# Patient Record
Sex: MALE | Race: WHITE | NOT HISPANIC OR LATINO | Employment: FULL TIME | URBAN - METROPOLITAN AREA
[De-identification: names, ages, dates, MRNs, and addresses within clinical notes are randomized per-mention and may not be internally consistent; named-entity substitution may affect disease eponyms.]

---

## 2019-08-19 ENCOUNTER — HISTORICAL (OUTPATIENT)
Dept: RADIOLOGY | Facility: HOSPITAL | Age: 54
End: 2019-08-19

## 2022-04-10 ENCOUNTER — HISTORICAL (OUTPATIENT)
Dept: ADMINISTRATIVE | Facility: HOSPITAL | Age: 57
End: 2022-04-10

## 2022-04-28 VITALS
HEIGHT: 69 IN | WEIGHT: 198.56 LBS | BODY MASS INDEX: 29.41 KG/M2 | DIASTOLIC BLOOD PRESSURE: 80 MMHG | SYSTOLIC BLOOD PRESSURE: 122 MMHG

## 2022-05-02 ENCOUNTER — HOSPITAL ENCOUNTER (OUTPATIENT)
Dept: RADIOLOGY | Facility: HOSPITAL | Age: 57
Discharge: HOME | End: 2022-05-02
Attending: INTERNAL MEDICINE
Payer: COMMERCIAL

## 2022-05-02 DIAGNOSIS — M25.511 RIGHT SHOULDER PAIN: Primary | ICD-10-CM

## 2022-05-02 DIAGNOSIS — M25.511 RIGHT SHOULDER PAIN: ICD-10-CM

## 2022-05-02 PROCEDURE — 73030 X-RAY EXAM OF SHOULDER: CPT | Mod: TC,RT

## 2023-11-13 ENCOUNTER — HOSPITAL ENCOUNTER (OUTPATIENT)
Dept: RADIOLOGY | Facility: HOSPITAL | Age: 58
Discharge: HOME | End: 2023-11-13
Attending: INTERNAL MEDICINE
Payer: COMMERCIAL

## 2023-11-13 DIAGNOSIS — R05.1 ACUTE COUGH: ICD-10-CM

## 2023-11-13 PROCEDURE — 71046 X-RAY EXAM CHEST 2 VIEWS: CPT | Mod: TC

## 2023-11-27 DIAGNOSIS — Z12.2 SCREENING FOR MALIGNANT NEOPLASM OF RESPIRATORY ORGAN: Primary | ICD-10-CM

## 2023-12-12 ENCOUNTER — HOSPITAL ENCOUNTER (OUTPATIENT)
Dept: RADIOLOGY | Facility: HOSPITAL | Age: 58
Discharge: HOME | End: 2023-12-12
Attending: INTERNAL MEDICINE
Payer: COMMERCIAL

## 2023-12-12 DIAGNOSIS — Z12.2 SCREENING FOR MALIGNANT NEOPLASM OF RESPIRATORY ORGAN: ICD-10-CM

## 2023-12-12 PROCEDURE — 71271 CT THORAX LUNG CANCER SCR C-: CPT | Mod: TC

## 2023-12-14 ENCOUNTER — HOSPITAL ENCOUNTER (OUTPATIENT)
Dept: RADIOLOGY | Facility: HOSPITAL | Age: 58
Discharge: HOME | End: 2023-12-14
Attending: CHIROPRACTOR
Payer: COMMERCIAL

## 2023-12-14 DIAGNOSIS — M54.2 CERVICALGIA: ICD-10-CM

## 2023-12-14 PROCEDURE — 72100 X-RAY EXAM L-S SPINE 2/3 VWS: CPT | Mod: TC

## 2023-12-14 PROCEDURE — 72070 X-RAY EXAM THORAC SPINE 2VWS: CPT | Mod: TC

## 2023-12-14 PROCEDURE — 72040 X-RAY EXAM NECK SPINE 2-3 VW: CPT | Mod: TC

## 2024-12-03 DIAGNOSIS — Z12.2 SCREENING FOR MALIGNANT NEOPLASM OF RESPIRATORY ORGAN: Primary | ICD-10-CM

## 2024-12-13 ENCOUNTER — HOSPITAL ENCOUNTER (OUTPATIENT)
Dept: RADIOLOGY | Facility: HOSPITAL | Age: 59
Discharge: HOME | End: 2024-12-13
Attending: INTERNAL MEDICINE
Payer: COMMERCIAL

## 2024-12-13 DIAGNOSIS — Z12.2 SCREENING FOR MALIGNANT NEOPLASM OF RESPIRATORY ORGAN: ICD-10-CM

## 2024-12-13 PROCEDURE — 71271 CT THORAX LUNG CANCER SCR C-: CPT | Mod: TC

## 2025-06-23 ENCOUNTER — HOSPITAL ENCOUNTER (OUTPATIENT)
Facility: HOSPITAL | Age: 60
Discharge: HOME | End: 2025-06-24
Attending: INTERNAL MEDICINE | Admitting: INTERNAL MEDICINE
Payer: COMMERCIAL

## 2025-06-23 ENCOUNTER — ANESTHESIA (OUTPATIENT)
Dept: SURGERY | Facility: HOSPITAL | Age: 60
End: 2025-06-23
Payer: COMMERCIAL

## 2025-06-23 ENCOUNTER — ANESTHESIA EVENT (OUTPATIENT)
Dept: SURGERY | Facility: HOSPITAL | Age: 60
End: 2025-06-23
Payer: COMMERCIAL

## 2025-06-23 DIAGNOSIS — R00.0 TACHYCARDIA: ICD-10-CM

## 2025-06-23 DIAGNOSIS — I95.1 ORTHOSTATIC HYPOTENSION: ICD-10-CM

## 2025-06-23 LAB
ABORH RETYPE: NORMAL
ALBUMIN SERPL-MCNC: 4.2 G/DL (ref 3.4–4.8)
ALBUMIN/GLOB SERPL: 1.1 RATIO (ref 1.1–2)
ALP SERPL-CCNC: 60 UNIT/L (ref 40–150)
ALT SERPL-CCNC: 20 UNIT/L (ref 0–55)
ANION GAP SERPL CALC-SCNC: 11 MEQ/L
APTT PPP: 31.5 SECONDS (ref 24.2–35.9)
AST SERPL-CCNC: 19 UNIT/L (ref 11–45)
BACTERIA #/AREA URNS AUTO: ABNORMAL /HPF
BASOPHILS # BLD AUTO: 0.04 X10(3)/MCL
BASOPHILS NFR BLD AUTO: 0.4 %
BILIRUB SERPL-MCNC: 0.7 MG/DL
BILIRUB UR QL STRIP.AUTO: NEGATIVE
BUN SERPL-MCNC: 22 MG/DL (ref 8.4–25.7)
CALCIUM SERPL-MCNC: 10.1 MG/DL (ref 8.8–10)
CAOX CRY UR QL COMP ASSIST: ABNORMAL
CHLORIDE SERPL-SCNC: 102 MMOL/L (ref 98–107)
CLARITY UR: CLEAR
CO2 SERPL-SCNC: 25 MMOL/L (ref 23–31)
COLOR UR AUTO: YELLOW
CREAT SERPL-MCNC: 2.4 MG/DL (ref 0.72–1.25)
CREAT/UREA NIT SERPL: 9
EOSINOPHIL # BLD AUTO: 0.01 X10(3)/MCL (ref 0–0.9)
EOSINOPHIL NFR BLD AUTO: 0.1 %
ERYTHROCYTE [DISTWIDTH] IN BLOOD BY AUTOMATED COUNT: 14.6 % (ref 11.5–17)
GFR SERPLBLD CREATININE-BSD FMLA CKD-EPI: 30 ML/MIN/1.73/M2
GLOBULIN SER-MCNC: 3.7 GM/DL (ref 2.4–3.5)
GLUCOSE SERPL-MCNC: 93 MG/DL (ref 82–115)
GLUCOSE UR QL STRIP: NEGATIVE
GROUP & RH: NORMAL
HCT VFR BLD AUTO: 36.5 % (ref 42–52)
HCT VFR BLD AUTO: 43.8 % (ref 42–52)
HGB BLD-MCNC: 11.6 G/DL (ref 14–18)
HGB BLD-MCNC: 13.9 G/DL (ref 14–18)
HGB UR QL STRIP: NEGATIVE
IMM GRANULOCYTES # BLD AUTO: 0.03 X10(3)/MCL (ref 0–0.04)
IMM GRANULOCYTES NFR BLD AUTO: 0.3 %
INDIRECT COOMBS: NORMAL
INR PPP: 1
KETONES UR QL STRIP: NEGATIVE
LEUKOCYTE ESTERASE UR QL STRIP: NEGATIVE
LYMPHOCYTES # BLD AUTO: 1.47 X10(3)/MCL (ref 0.6–4.6)
LYMPHOCYTES NFR BLD AUTO: 13 %
MAGNESIUM SERPL-MCNC: 1.8 MG/DL (ref 1.6–2.6)
MCH RBC QN AUTO: 26.4 PG (ref 27–31)
MCHC RBC AUTO-ENTMCNC: 31.7 G/DL (ref 33–36)
MCV RBC AUTO: 83.3 FL (ref 80–94)
MONOCYTES # BLD AUTO: 1.05 X10(3)/MCL (ref 0.1–1.3)
MONOCYTES NFR BLD AUTO: 9.3 %
NEUTROPHILS # BLD AUTO: 8.7 X10(3)/MCL (ref 2.1–9.2)
NEUTROPHILS NFR BLD AUTO: 76.9 %
NITRITE UR QL STRIP: NEGATIVE
NRBC BLD AUTO-RTO: 0 %
OHS QRS DURATION: 94 MS
OHS QTC CALCULATION: 459 MS
PH UR STRIP: 7 [PH]
PHOSPHATE SERPL-MCNC: 4.2 MG/DL (ref 2.3–4.7)
PLATELET # BLD AUTO: 314 X10(3)/MCL (ref 130–400)
PMV BLD AUTO: 11.5 FL (ref 7.4–10.4)
POTASSIUM SERPL-SCNC: 3.9 MMOL/L (ref 3.5–5.1)
PROT SERPL-MCNC: 7.9 GM/DL (ref 5.8–7.6)
PROT UR QL STRIP: ABNORMAL
PROTHROMBIN TIME: 13.8 SECONDS (ref 12.4–14.9)
RBC # BLD AUTO: 5.26 X10(6)/MCL (ref 4.7–6.1)
RBC #/AREA URNS AUTO: ABNORMAL /HPF
SODIUM SERPL-SCNC: 138 MMOL/L (ref 136–145)
SP GR UR STRIP.AUTO: 1.01 (ref 1–1.03)
SPECIMEN OUTDATE: NORMAL
SQUAMOUS #/AREA URNS AUTO: ABNORMAL /HPF
TROPONIN I SERPL-MCNC: <0.01 NG/ML (ref 0–0.04)
UROBILINOGEN UR STRIP-ACNC: 0.2
WBC # BLD AUTO: 11.3 X10(3)/MCL (ref 4.5–11.5)
WBC #/AREA URNS AUTO: ABNORMAL /HPF

## 2025-06-23 PROCEDURE — 99900035 HC TECH TIME PER 15 MIN (STAT)

## 2025-06-23 PROCEDURE — 25000003 PHARM REV CODE 250: Performed by: SURGERY

## 2025-06-23 PROCEDURE — 11000001 HC ACUTE MED/SURG PRIVATE ROOM

## 2025-06-23 PROCEDURE — 94799 UNLISTED PULMONARY SVC/PX: CPT

## 2025-06-23 PROCEDURE — 93010 ELECTROCARDIOGRAM REPORT: CPT | Mod: ,,, | Performed by: STUDENT IN AN ORGANIZED HEALTH CARE EDUCATION/TRAINING PROGRAM

## 2025-06-23 PROCEDURE — G0378 HOSPITAL OBSERVATION PER HR: HCPCS

## 2025-06-23 PROCEDURE — 86923 COMPATIBILITY TEST ELECTRIC: CPT | Performed by: INTERNAL MEDICINE

## 2025-06-23 PROCEDURE — 84100 ASSAY OF PHOSPHORUS: CPT | Performed by: INTERNAL MEDICINE

## 2025-06-23 PROCEDURE — 94761 N-INVAS EAR/PLS OXIMETRY MLT: CPT

## 2025-06-23 PROCEDURE — 37000008 HC ANESTHESIA 1ST 15 MINUTES: Performed by: SURGERY

## 2025-06-23 PROCEDURE — 27201423 OPTIME MED/SURG SUP & DEVICES STERILE SUPPLY: Performed by: SURGERY

## 2025-06-23 PROCEDURE — G0379 DIRECT REFER HOSPITAL OBSERV: HCPCS

## 2025-06-23 PROCEDURE — 83735 ASSAY OF MAGNESIUM: CPT | Performed by: INTERNAL MEDICINE

## 2025-06-23 PROCEDURE — 85025 COMPLETE CBC W/AUTO DIFF WBC: CPT | Performed by: INTERNAL MEDICINE

## 2025-06-23 PROCEDURE — 25000003 PHARM REV CODE 250: Performed by: INTERNAL MEDICINE

## 2025-06-23 PROCEDURE — 63600175 PHARM REV CODE 636 W HCPCS: Performed by: SURGERY

## 2025-06-23 PROCEDURE — 86901 BLOOD TYPING SEROLOGIC RH(D): CPT | Performed by: INTERNAL MEDICINE

## 2025-06-23 PROCEDURE — 84484 ASSAY OF TROPONIN QUANT: CPT | Performed by: INTERNAL MEDICINE

## 2025-06-23 PROCEDURE — 85730 THROMBOPLASTIN TIME PARTIAL: CPT | Performed by: INTERNAL MEDICINE

## 2025-06-23 PROCEDURE — 93005 ELECTROCARDIOGRAM TRACING: CPT

## 2025-06-23 PROCEDURE — 85018 HEMOGLOBIN: CPT | Performed by: INTERNAL MEDICINE

## 2025-06-23 PROCEDURE — 81001 URINALYSIS AUTO W/SCOPE: CPT | Performed by: INTERNAL MEDICINE

## 2025-06-23 PROCEDURE — 43239 EGD BIOPSY SINGLE/MULTIPLE: CPT | Performed by: SURGERY

## 2025-06-23 PROCEDURE — 80053 COMPREHEN METABOLIC PANEL: CPT | Performed by: INTERNAL MEDICINE

## 2025-06-23 PROCEDURE — 85610 PROTHROMBIN TIME: CPT | Performed by: INTERNAL MEDICINE

## 2025-06-23 PROCEDURE — 63600175 PHARM REV CODE 636 W HCPCS: Performed by: INTERNAL MEDICINE

## 2025-06-23 PROCEDURE — 36415 COLL VENOUS BLD VENIPUNCTURE: CPT | Performed by: INTERNAL MEDICINE

## 2025-06-23 PROCEDURE — 63600175 PHARM REV CODE 636 W HCPCS: Performed by: NURSE ANESTHETIST, CERTIFIED REGISTERED

## 2025-06-23 RX ORDER — ONDANSETRON HYDROCHLORIDE 2 MG/ML
4 INJECTION, SOLUTION INTRAVENOUS EVERY 6 HOURS PRN
Status: DISCONTINUED | OUTPATIENT
Start: 2025-06-23 | End: 2025-06-24 | Stop reason: HOSPADM

## 2025-06-23 RX ORDER — PANTOPRAZOLE SODIUM 40 MG/1
40 TABLET, DELAYED RELEASE ORAL DAILY
COMMUNITY

## 2025-06-23 RX ORDER — SODIUM CHLORIDE 9 MG/ML
INJECTION, SOLUTION INTRAVENOUS CONTINUOUS
Status: DISCONTINUED | OUTPATIENT
Start: 2025-06-23 | End: 2025-06-24 | Stop reason: HOSPADM

## 2025-06-23 RX ORDER — ACETAMINOPHEN 325 MG/1
650 TABLET ORAL EVERY 4 HOURS PRN
Status: DISCONTINUED | OUTPATIENT
Start: 2025-06-23 | End: 2025-06-24 | Stop reason: HOSPADM

## 2025-06-23 RX ORDER — PANTOPRAZOLE SODIUM 40 MG/10ML
40 INJECTION, POWDER, LYOPHILIZED, FOR SOLUTION INTRAVENOUS DAILY
Status: DISCONTINUED | OUTPATIENT
Start: 2025-06-23 | End: 2025-06-23

## 2025-06-23 RX ORDER — SUCRALFATE 1 G/10ML
1 SUSPENSION ORAL EVERY 6 HOURS
Status: DISCONTINUED | OUTPATIENT
Start: 2025-06-24 | End: 2025-06-24 | Stop reason: HOSPADM

## 2025-06-23 RX ORDER — HYDROCODONE BITARTRATE AND ACETAMINOPHEN 500; 5 MG/1; MG/1
TABLET ORAL
Status: DISCONTINUED | OUTPATIENT
Start: 2025-06-23 | End: 2025-06-24 | Stop reason: HOSPADM

## 2025-06-23 RX ORDER — LIDOCAINE HYDROCHLORIDE 20 MG/ML
INJECTION INTRAVENOUS
Status: DISCONTINUED | OUTPATIENT
Start: 2025-06-23 | End: 2025-06-23

## 2025-06-23 RX ORDER — SODIUM CHLORIDE 9 MG/ML
INJECTION, SOLUTION INTRAVENOUS CONTINUOUS
Status: DISCONTINUED | OUTPATIENT
Start: 2025-06-23 | End: 2025-06-23

## 2025-06-23 RX ORDER — HYDROMORPHONE HYDROCHLORIDE 2 MG/ML
0.25 INJECTION, SOLUTION INTRAMUSCULAR; INTRAVENOUS; SUBCUTANEOUS EVERY 4 HOURS PRN
Status: DISCONTINUED | OUTPATIENT
Start: 2025-06-23 | End: 2025-06-24 | Stop reason: HOSPADM

## 2025-06-23 RX ORDER — TALC
6 POWDER (GRAM) TOPICAL NIGHTLY PRN
Status: DISCONTINUED | OUTPATIENT
Start: 2025-06-23 | End: 2025-06-24 | Stop reason: HOSPADM

## 2025-06-23 RX ORDER — FAMOTIDINE 20 MG/1
20 TABLET, FILM COATED ORAL DAILY
Status: DISCONTINUED | OUTPATIENT
Start: 2025-06-23 | End: 2025-06-23 | Stop reason: SDUPTHER

## 2025-06-23 RX ORDER — CELECOXIB 200 MG/1
200 CAPSULE ORAL DAILY
Status: ON HOLD | COMMUNITY
End: 2025-06-24 | Stop reason: HOSPADM

## 2025-06-23 RX ORDER — HYDROCODONE BITARTRATE AND ACETAMINOPHEN 5; 325 MG/1; MG/1
1 TABLET ORAL EVERY 4 HOURS PRN
Status: DISCONTINUED | OUTPATIENT
Start: 2025-06-23 | End: 2025-06-24 | Stop reason: HOSPADM

## 2025-06-23 RX ORDER — MUPIROCIN 20 MG/G
1 OINTMENT TOPICAL 2 TIMES DAILY
Status: DISCONTINUED | OUTPATIENT
Start: 2025-06-23 | End: 2025-06-24 | Stop reason: HOSPADM

## 2025-06-23 RX ORDER — BISMUTH SUBSALICYLATE 525 MG/30ML
30 LIQUID ORAL EVERY 6 HOURS
Status: DISCONTINUED | OUTPATIENT
Start: 2025-06-24 | End: 2025-06-24 | Stop reason: HOSPADM

## 2025-06-23 RX ORDER — CYANOCOBALAMIN 1000 UG/ML
1000 INJECTION, SOLUTION INTRAMUSCULAR; SUBCUTANEOUS DAILY
Status: DISCONTINUED | OUTPATIENT
Start: 2025-06-24 | End: 2025-06-24 | Stop reason: HOSPADM

## 2025-06-23 RX ORDER — FAMOTIDINE 20 MG/1
20 TABLET, FILM COATED ORAL DAILY
Status: DISCONTINUED | OUTPATIENT
Start: 2025-06-23 | End: 2025-06-24 | Stop reason: HOSPADM

## 2025-06-23 RX ORDER — OLMESARTAN MEDOXOMIL AND HYDROCHLOROTHIAZIDE 40/25 40; 25 MG/1; MG/1
1 TABLET ORAL DAILY
COMMUNITY

## 2025-06-23 RX ORDER — SUCRALFATE 1 G/1
1 TABLET ORAL
Status: DISCONTINUED | OUTPATIENT
Start: 2025-06-23 | End: 2025-06-23

## 2025-06-23 RX ORDER — ALPRAZOLAM 0.5 MG/1
0.5 TABLET ORAL 3 TIMES DAILY PRN
COMMUNITY

## 2025-06-23 RX ORDER — PROPOFOL 10 MG/ML
VIAL (ML) INTRAVENOUS
Status: DISCONTINUED | OUTPATIENT
Start: 2025-06-23 | End: 2025-06-23

## 2025-06-23 RX ORDER — ACETAMINOPHEN 325 MG/1
650 TABLET ORAL EVERY 6 HOURS PRN
Status: DISCONTINUED | OUTPATIENT
Start: 2025-06-23 | End: 2025-06-24 | Stop reason: HOSPADM

## 2025-06-23 RX ORDER — CLOPIDOGREL BISULFATE 75 MG/1
75 TABLET ORAL DAILY
COMMUNITY

## 2025-06-23 RX ORDER — ERGOCALCIFEROL 1.25 MG/1
50000 CAPSULE ORAL
COMMUNITY

## 2025-06-23 RX ORDER — PANTOPRAZOLE SODIUM 40 MG/10ML
80 INJECTION, POWDER, LYOPHILIZED, FOR SOLUTION INTRAVENOUS ONCE
Status: COMPLETED | OUTPATIENT
Start: 2025-06-23 | End: 2025-06-23

## 2025-06-23 RX ADMIN — SUCRALFATE 1 G: 1 SUSPENSION ORAL at 11:06

## 2025-06-23 RX ADMIN — SODIUM CHLORIDE 125 MG: 9 INJECTION, SOLUTION INTRAVENOUS at 10:06

## 2025-06-23 RX ADMIN — SODIUM CHLORIDE: 9 INJECTION, SOLUTION INTRAVENOUS at 01:06

## 2025-06-23 RX ADMIN — BISMUTH SUBSALICYLATE 30 ML: 525 LIQUID ORAL at 11:06

## 2025-06-23 RX ADMIN — PROPOFOL 50 MG: 10 INJECTION, EMULSION INTRAVENOUS at 07:06

## 2025-06-23 RX ADMIN — FOLIC ACID 5 MG: 5 INJECTION, SOLUTION INTRAMUSCULAR; INTRAVENOUS; SUBCUTANEOUS at 10:06

## 2025-06-23 RX ADMIN — PANTOPRAZOLE SODIUM 80 MG: 40 INJECTION, POWDER, FOR SOLUTION INTRAVENOUS at 12:06

## 2025-06-23 RX ADMIN — PANTOPRAZOLE SODIUM 8 MG/HR: 40 INJECTION, POWDER, FOR SOLUTION INTRAVENOUS at 05:06

## 2025-06-23 RX ADMIN — MUPIROCIN 1 G: 20 OINTMENT TOPICAL at 09:06

## 2025-06-23 RX ADMIN — SODIUM CHLORIDE: 9 INJECTION, SOLUTION INTRAVENOUS at 09:06

## 2025-06-23 RX ADMIN — LIDOCAINE HYDROCHLORIDE 100 MG: 20 INJECTION, SOLUTION INTRAVENOUS at 07:06

## 2025-06-23 RX ADMIN — SODIUM CHLORIDE: 9 INJECTION, SOLUTION INTRAVENOUS at 10:06

## 2025-06-23 RX ADMIN — FAMOTIDINE 20 MG: 20 TABLET, FILM COATED ORAL at 09:06

## 2025-06-23 RX ADMIN — SODIUM CHLORIDE 1000 ML: 9 INJECTION, SOLUTION INTRAVENOUS at 12:06

## 2025-06-23 RX ADMIN — PROPOFOL 100 MG: 10 INJECTION, EMULSION INTRAVENOUS at 07:06

## 2025-06-23 RX ADMIN — PANTOPRAZOLE SODIUM 8 MG/HR: 40 INJECTION, POWDER, FOR SOLUTION INTRAVENOUS at 01:06

## 2025-06-23 NOTE — ANESTHESIA PREPROCEDURE EVALUATION
06/23/2025  Rehan Cohn is a 59 y.o., male.      Pre-op Assessment    I have reviewed the Patient Summary Reports.     I have reviewed the Nursing Notes. I have reviewed the NPO Status.   I have reviewed the Medications.     Review of Systems  Cardiovascular:     Hypertension                                          Endocrine:        Obesity / BMI > 30      Physical Exam  General: Well nourished, Cooperative, Oriented and Alert    Airway:  Mallampati: II         Anesthesia Plan  Type of Anesthesia, risks & benefits discussed:    Anesthesia Type: Gen Natural Airway  Intra-op Monitoring Plan: Standard ASA Monitors  Informed Consent: Informed consent signed with the Patient and all parties understand the risks and agree with anesthesia plan.  All questions answered. Patient consented to blood products? Yes  ASA Score: 2 Emergent    Ready For Surgery From Anesthesia Perspective.     .

## 2025-06-24 VITALS
RESPIRATION RATE: 16 BRPM | BODY MASS INDEX: 31.1 KG/M2 | HEART RATE: 80 BPM | SYSTOLIC BLOOD PRESSURE: 116 MMHG | OXYGEN SATURATION: 98 % | WEIGHT: 210 LBS | HEIGHT: 69 IN | DIASTOLIC BLOOD PRESSURE: 66 MMHG | TEMPERATURE: 98 F

## 2025-06-24 PROBLEM — I95.1 ORTHOSTATIC HYPOTENSION: Status: RESOLVED | Noted: 2025-06-23 | Resolved: 2025-06-24

## 2025-06-24 LAB
ALBUMIN SERPL-MCNC: 3.2 G/DL (ref 3.4–4.8)
ALBUMIN/GLOB SERPL: 1.1 RATIO (ref 1.1–2)
ALP SERPL-CCNC: 46 UNIT/L (ref 40–150)
ALT SERPL-CCNC: 14 UNIT/L (ref 0–55)
ANION GAP SERPL CALC-SCNC: 6 MEQ/L
AST SERPL-CCNC: 14 UNIT/L (ref 11–45)
BASOPHILS # BLD AUTO: 0.05 X10(3)/MCL
BASOPHILS NFR BLD AUTO: 0.7 %
BILIRUB SERPL-MCNC: 0.6 MG/DL
BUN SERPL-MCNC: 20 MG/DL (ref 8.4–25.7)
CALCIUM SERPL-MCNC: 8.2 MG/DL (ref 8.8–10)
CHLORIDE SERPL-SCNC: 109 MMOL/L (ref 98–107)
CO2 SERPL-SCNC: 24 MMOL/L (ref 23–31)
CREAT SERPL-MCNC: 1.67 MG/DL (ref 0.72–1.25)
CREAT/UREA NIT SERPL: 12
EOSINOPHIL # BLD AUTO: 0.05 X10(3)/MCL (ref 0–0.9)
EOSINOPHIL NFR BLD AUTO: 0.7 %
ERYTHROCYTE [DISTWIDTH] IN BLOOD BY AUTOMATED COUNT: 14.6 % (ref 11.5–17)
GFR SERPLBLD CREATININE-BSD FMLA CKD-EPI: 46 ML/MIN/1.73/M2
GLOBULIN SER-MCNC: 2.8 GM/DL (ref 2.4–3.5)
GLUCOSE SERPL-MCNC: 84 MG/DL (ref 82–115)
HCT VFR BLD AUTO: 34.2 % (ref 42–52)
HCT VFR BLD AUTO: 37.2 % (ref 42–52)
HGB BLD-MCNC: 10.9 G/DL (ref 14–18)
HGB BLD-MCNC: 11.7 G/DL (ref 14–18)
IMM GRANULOCYTES # BLD AUTO: 0.02 X10(3)/MCL (ref 0–0.04)
IMM GRANULOCYTES NFR BLD AUTO: 0.3 %
LYMPHOCYTES # BLD AUTO: 2.54 X10(3)/MCL (ref 0.6–4.6)
LYMPHOCYTES NFR BLD AUTO: 34.9 %
MAGNESIUM SERPL-MCNC: 1.9 MG/DL (ref 1.6–2.6)
MCH RBC QN AUTO: 26.6 PG (ref 27–31)
MCHC RBC AUTO-ENTMCNC: 31.9 G/DL (ref 33–36)
MCV RBC AUTO: 83.4 FL (ref 80–94)
MONOCYTES # BLD AUTO: 0.76 X10(3)/MCL (ref 0.1–1.3)
MONOCYTES NFR BLD AUTO: 10.4 %
NEUTROPHILS # BLD AUTO: 3.86 X10(3)/MCL (ref 2.1–9.2)
NEUTROPHILS NFR BLD AUTO: 53 %
NRBC BLD AUTO-RTO: 0 %
PHOSPHATE SERPL-MCNC: 3.1 MG/DL (ref 2.3–4.7)
PLATELET # BLD AUTO: 237 X10(3)/MCL (ref 130–400)
PMV BLD AUTO: 11.2 FL (ref 7.4–10.4)
POTASSIUM SERPL-SCNC: 3.4 MMOL/L (ref 3.5–5.1)
PROT SERPL-MCNC: 6 GM/DL (ref 5.8–7.6)
RBC # BLD AUTO: 4.1 X10(6)/MCL (ref 4.7–6.1)
SODIUM SERPL-SCNC: 139 MMOL/L (ref 136–145)
WBC # BLD AUTO: 7.28 X10(3)/MCL (ref 4.5–11.5)

## 2025-06-24 PROCEDURE — 94799 UNLISTED PULMONARY SVC/PX: CPT

## 2025-06-24 PROCEDURE — 63600175 PHARM REV CODE 636 W HCPCS: Performed by: SURGERY

## 2025-06-24 PROCEDURE — 96365 THER/PROPH/DIAG IV INF INIT: CPT

## 2025-06-24 PROCEDURE — 94761 N-INVAS EAR/PLS OXIMETRY MLT: CPT

## 2025-06-24 PROCEDURE — 99900035 HC TECH TIME PER 15 MIN (STAT)

## 2025-06-24 PROCEDURE — 85014 HEMATOCRIT: CPT | Performed by: INTERNAL MEDICINE

## 2025-06-24 PROCEDURE — 63600175 PHARM REV CODE 636 W HCPCS: Performed by: INTERNAL MEDICINE

## 2025-06-24 PROCEDURE — 80053 COMPREHEN METABOLIC PANEL: CPT | Performed by: SURGERY

## 2025-06-24 PROCEDURE — 96375 TX/PRO/DX INJ NEW DRUG ADDON: CPT

## 2025-06-24 PROCEDURE — 25000003 PHARM REV CODE 250: Performed by: SURGERY

## 2025-06-24 PROCEDURE — G0378 HOSPITAL OBSERVATION PER HR: HCPCS

## 2025-06-24 PROCEDURE — 84100 ASSAY OF PHOSPHORUS: CPT | Performed by: INTERNAL MEDICINE

## 2025-06-24 PROCEDURE — 83735 ASSAY OF MAGNESIUM: CPT | Performed by: INTERNAL MEDICINE

## 2025-06-24 PROCEDURE — 25000003 PHARM REV CODE 250: Performed by: INTERNAL MEDICINE

## 2025-06-24 PROCEDURE — 36415 COLL VENOUS BLD VENIPUNCTURE: CPT | Performed by: INTERNAL MEDICINE

## 2025-06-24 PROCEDURE — 96372 THER/PROPH/DIAG INJ SC/IM: CPT | Performed by: SURGERY

## 2025-06-24 PROCEDURE — 85025 COMPLETE CBC W/AUTO DIFF WBC: CPT | Performed by: INTERNAL MEDICINE

## 2025-06-24 RX ORDER — SUCRALFATE 1 G/10ML
1 SUSPENSION ORAL EVERY 6 HOURS
Qty: 1200 ML | Refills: 0 | Status: SHIPPED | OUTPATIENT
Start: 2025-06-24 | End: 2025-07-24

## 2025-06-24 RX ORDER — BISMUTH SUBSALICYLATE 525 MG/30ML
30 LIQUID ORAL EVERY 6 HOURS
Qty: 473 ML | Refills: 0 | Status: SHIPPED | OUTPATIENT
Start: 2025-06-24 | End: 2025-07-24

## 2025-06-24 RX ORDER — POLYETHYLENE GLYCOL 3350 17 G/17G
238 POWDER, FOR SOLUTION ORAL ONCE
Status: DISCONTINUED | OUTPATIENT
Start: 2025-06-24 | End: 2025-06-24 | Stop reason: HOSPADM

## 2025-06-24 RX ORDER — FAMOTIDINE 20 MG/1
20 TABLET, FILM COATED ORAL DAILY
Qty: 30 TABLET | Refills: 0 | Status: SHIPPED | OUTPATIENT
Start: 2025-06-24 | End: 2025-07-24

## 2025-06-24 RX ORDER — PANTOPRAZOLE SODIUM 40 MG/1
40 TABLET, DELAYED RELEASE ORAL DAILY
Qty: 30 TABLET | Refills: 0 | Status: SHIPPED | OUTPATIENT
Start: 2025-06-24 | End: 2026-06-24

## 2025-06-24 RX ORDER — POTASSIUM CHLORIDE 20 MEQ/1
20 TABLET, EXTENDED RELEASE ORAL ONCE
Status: COMPLETED | OUTPATIENT
Start: 2025-06-24 | End: 2025-06-24

## 2025-06-24 RX ADMIN — PANTOPRAZOLE SODIUM 8 MG/HR: 40 INJECTION, POWDER, FOR SOLUTION INTRAVENOUS at 12:06

## 2025-06-24 RX ADMIN — PANTOPRAZOLE SODIUM 8 MG/HR: 40 INJECTION, POWDER, FOR SOLUTION INTRAVENOUS at 04:06

## 2025-06-24 RX ADMIN — POTASSIUM CHLORIDE 20 MEQ: 1500 TABLET, EXTENDED RELEASE ORAL at 09:06

## 2025-06-24 RX ADMIN — BISMUTH SUBSALICYLATE 30 ML: 525 LIQUID ORAL at 11:06

## 2025-06-24 RX ADMIN — BISMUTH SUBSALICYLATE 30 ML: 525 LIQUID ORAL at 05:06

## 2025-06-24 RX ADMIN — THIAMINE HYDROCHLORIDE 500 MG: 100 INJECTION, SOLUTION INTRAMUSCULAR; INTRAVENOUS at 09:06

## 2025-06-24 RX ADMIN — PANTOPRAZOLE SODIUM 8 MG/HR: 40 INJECTION, POWDER, FOR SOLUTION INTRAVENOUS at 09:06

## 2025-06-24 RX ADMIN — SUCRALFATE 1 G: 1 SUSPENSION ORAL at 11:06

## 2025-06-24 RX ADMIN — SUCRALFATE 1 G: 1 SUSPENSION ORAL at 05:06

## 2025-06-24 RX ADMIN — MUPIROCIN 1 G: 20 OINTMENT TOPICAL at 09:06

## 2025-06-24 RX ADMIN — CYANOCOBALAMIN 1000 MCG: 1000 INJECTION INTRAMUSCULAR; SUBCUTANEOUS at 09:06

## 2025-06-24 RX ADMIN — SODIUM CHLORIDE: 9 INJECTION, SOLUTION INTRAVENOUS at 06:06

## 2025-06-24 NOTE — DISCHARGE SUMMARY
[facility] - Medical Surgical Unit  Discharge Note  Short Stay    Procedure(s) (LRB):  EGD, WITH CLOSED BIOPSY (N/A)      OUTCOME: Patient tolerated treatment/procedure well without complication and is now ready for discharge.    DISPOSITION: Home or Self Care      FINAL DIAGNOSIS:       * Orthostatic hypotension [I95.1]  EGD, WITH CLOSED BIOPSY (N/A)   1. Normal duodenal in all 4 portions and into the jejunum   2. Normal antrum fundus and cardia of the stomach   3. Z-line at 36 cm with a 4 cm hiatal hernia  4. Linear ulcerations within the hiatal hernia most likely the source of the hematemesis   5. Cold biopsy of the antrum and GE junction taken for histo path and H pylori   6. Normal esophagus cricopharyngeus muscle vocal cords  FOLLOWUP: In clinic 1 week    DISCHARGE INSTRUCTIONS:  No discharge procedures on file.     TIME SPENT ON DISCHARGE:  5 minutes

## 2025-06-24 NOTE — ANESTHESIA POSTPROCEDURE EVALUATION
Anesthesia Post Evaluation    Patient: Rehan Cohn    Procedure(s) Performed: Procedure(s) (LRB):  EGD, WITH CLOSED BIOPSY (N/A)    Final Anesthesia Type: general      Patient participation: Yes- Able to Participate  Level of consciousness: awake and alert  Post-procedure vital signs: reviewed and stable  Pain management: adequate  Airway patency: patent    PONV status at discharge: No PONV  Anesthetic complications: no      Cardiovascular status: blood pressure returned to baseline  Respiratory status: unassisted  Hydration status: euvolemic  Follow-up not needed.              Vitals Value Taken Time   /81 06/23/25 16:02   Temp 36.4 °C (97.6 °F) 06/23/25 16:02   Pulse 85 06/23/25 16:02   Resp 16 06/23/25 11:39   SpO2 96 % 06/23/25 16:02         No case tracking events are documented in the log.      Pain/Ovidio Score: No data recorded

## 2025-06-24 NOTE — DISCHARGE SUMMARY
[facility] - Medical Surgical Unit  Discharge Note  Short Stay    Procedure(s) (LRB):  EGD, WITH CLOSED BIOPSY (N/A)      OUTCOME: Patient tolerated treatment/procedure well without complication and is now ready for discharge.    DISPOSITION: Home or Self Care    FINAL DIAGNOSIS:  Orthostatic hypotension  Patient is a 59-year-old  male with a history of midepigastric abdominal pain nausea vomiting had hematemesis with white count of 11 hemoglobin of 13 hematocrit of 43 platelet count was 314 renal profile was unremarkable creatinine was 2.4 with a BUN of 22.  Patient required significant hydration and endoscopic evaluation of the upper gastrointestinal tract.  Troponin level was within normal limits and UA was with a +1 protein and occasional calcium oxalate crystals identified.  Chest x-ray was negative.  I was consulted for endoscopic evaluation of the upper gastrointestinal tract.   Orthostatic hypotension [I95.1]  EGD, WITH CLOSED BIOPSY (N/A)   1. Normal duodenal in all 4 portions and into the jejunum   2. Normal antrum fundus and cardia of the stomach   3. Z-line at 36 cm with a 4 cm hiatal hernia  4. Linear ulcerations within the hiatal hernia most likely the source of the hematemesis   5. Cold biopsy of the antrum and GE junction taken for histo path and H pylori   6. Normal esophagus cricopharyngeus muscle vocal cords     Plan   1. Carafate 1 g p.o. Q 8 hours   2. Pepcid 20 mg p.o. q.8 hours  3. Pepto-Bismol 1 tbsp p.o. q.8 hours   4. Upper GI with small-bowel follow-through   5. Gastric emptying study   6. 24 hour pH studies  7. Manometric studies   8. Robotic hiatal hernia repair with fundoplication pending above     FOLLOWUP: In clinic 1 week    DISCHARGE INSTRUCTIONS:  No discharge procedures on file.     TIME SPENT ON DISCHARGE:    minutes

## 2025-06-24 NOTE — H&P
[facility]                     [address] [facility] [address]    PATIENT NAME:       NE VALERO  YOB: 1965  CSN:                [id number]   MRN:                [id number]  ADMIT DATE:         06/23/2025 10:59:00  PHYSICIAN:          Dashawn Sanchez MD                        HISTORY AND PHYSICAL      CHIEF COMPLAINT:  The patient was made a direct admit when he came to the clinic   complaining of jitteriness, shortness of breath and not feeling well and was   found to be having orthostatic hypotension with a blood pressure of 98/62 while   sitting and when standing it dropped to 72/45 with a heart rate going from 98 to   127.    HISTORY OF PRESENT ILLNESS:  Mr. Suki Van a very pleasant 59-year-old   gentleman, who went this morning to work, but then left there because he was   having shortness of breath and not feeling well.  Complained of some nonspecific   palpitation and jitteriness.  On further asking, he said that he was having a   lot of burning sensation in his epigastric region and he felt nauseated and he   spitted out some blood, which he had taken picture, which is a speck of very   dark blood clot and that is when I reexamined the patient and history was   pertinent for having orthostatic changes, I made him a direct admit for   hydration as well as to find out if there is any acute or subacute GI bleed   going on.  The labs showed that his BUN and creatinine had risen   disproportionately so he definitely had a GI bleed going on.  Serial H and H   were ordered and Dr. Tyron Valente whom the patient had seen before will be   most likely doing an EGD this evening.  Besides abdominal discomfort, nausea,   and GERD symptoms, he did not have any other system-specific complaint.    PAST MEDICAL HISTORY:  Significant for hypertension, dyslipidemia, autoimmune   hemolytic anemia diagnosed in 2014  January and followed up with Oncology for a   long time, TIA, supraventricular tachycardia, polyarthritis, specially of   shoulders, gastroesophageal reflux disease, use of NSAIDs in the past.  He has   had negative stress test with Dr. Garcia, his cardiologist.  History of   hypovitaminosis D as well as dyslipidemia.    PAST SURGICAL HISTORY:  Includes hernia surgery, left wrist surgery, dental   implant injections in the neck, colonoscopy and EGD with Dr. Tyron Valente   in 2014.  He has a normal echocardiogram on 2020.    FAMILY HISTORY:  Dad  at the age of 70 from lung cancer.  He also had   coronary artery disease.  Mother also passed away in old age with dementia.  The   mother had diabetes mellitus, osteoarthritis, and hypertension.  He has 3   sisters and all sisters are fine.    ALLERGIES:  NO KNOWN DRUG ALLERGIES.     IMMUNIZATIONS:  The patient are up-to-date with his immunizations.    MEDICATIONS:  On which the patient is on are:  1. Mounjaro 5 mg 0.5 mL subcutaneous weekly.  2. Xanax 0.5 mg b.i.d. as needed.  3. Olmesartan/amlodipine/HCTZ 40/5/25 one tablet daily.  4. Plavix 75 mg daily.  5. Atorvastatin 40 mg daily.  6. Temazepam 30 mg at bedtime as needed.    REVIEW OF SYSTEMS:  As mentioned in History of Present Illness.    PHYSICAL EXAMINATION:  GENERAL:  The patient is alert and oriented x3.  As mentioned above orthostatic   hypotension is present in the clinic.  HEENT:  Head is normocephalic.  Pupils bilaterally reactive and equal in size.    Mild conjunctival pallor.  No scleral icterus.  Trachea is midline.  CHEST:  Good bilateral air entry.  CVS:  First and second sounds are heard normally without any murmurs.   ABDOMEN:  Soft, nontender.  EXTREMITIES:  Devoid of edema cyanosis or clubbing.    LABS AND INVESTIGATIONS:  Will be ordered and will be followed.    IMPRESSION:    1. Orthostatic hypotension in the patient who has mild hemoptysis most likely   secondary to  gastrointestinal bleed.  2. History of hypertension.  3. History of dyslipidemia.  4. History of transient ischemic attack.  5. History of gastroesophageal reflux disease.  6. History of polyarthritis.  7. History of obesity.    PLAN:  Admit the patient.  Consult the surgeon, Dr. Tyron Valente.  N.p.o.,   Protonix drip.  Bilateral SCDs.  Withhold all home medications.  Avoid NSAIDs.    Avoid any nephrotoxic drugs.  We will follow with the surgical recommendations.    Nuclear bleeding scan to be ordered.    Pleasure taking care of Mr. Suki Van during his stay at [facility] on the third floor.        ______________________________  Dashawn Sanchez MD    SS/AQS  DD:  06/23/2025  Time:  06:33PM  DT:  06/23/2025  Time:  08:46PM  Job #:  [id number]    cc:   MD Dr. Jose Casper        HISTORY AND PHYSICAL

## 2025-06-24 NOTE — H&P
The patient has been examined and the H&P has been reviewed:    I concur with the findings and no changes have occurred since H&P was written.    Staff present during examination : Eli Maxwell RN    Patient cleared for Anesthesia: MAC    Anesthesia/Surgery risks, benefits and alternative options discussed and understood by patient/family.    I have reviewed and agree with Anesthesia Plan of Care.    There are no hospital problems to display for this patient.

## 2025-06-24 NOTE — CONSULTS
[facility]  Medical Surgical Unit  General Surgery  Consult Note    Patient Name: Rehan Cohn  MRN: [id number]  Code Status: No Order  Admission Date: 6/23/2025  Hospital Length of Stay: 0 days  Attending Physician: Dashawn Sanchez MD  Primary Care Provider: Dashawn Sanchez MD      Staff present during examination : Eli Maxwell RN      Consults  Subjective:     Chief Complaint/Reason for Admission:  Hematemesis with a midepigastric pain       History of Present Illness:  Patient is a 59-year-old  male with a history of midepigastric abdominal pain nausea vomiting had hematemesis with white count of 11 hemoglobin of 13 hematocrit of 43 platelet count was 314 renal profile was unremarkable creatinine was 2.4 with a BUN of 22.  Patient required significant hydration and endoscopic evaluation of the upper gastrointestinal tract.  Troponin level was within normal limits and UA was with a +1 protein and occasional calcium oxalate crystals identified.  Chest x-ray was negative.  I was consulted for endoscopic evaluation of the upper gastrointestinal tract.        No current facility-administered medications on file prior to encounter.     Current Outpatient Medications on File Prior to Encounter   Medication Sig    ALPRAZolam (XANAX) 0.5 MG tablet Take 0.5 mg by mouth 3 (three) times daily as needed for Anxiety.    celecoxib (CELEBREX) 200 MG capsule Take 200 mg by mouth once daily.    clopidogreL (PLAVIX) 75 mg tablet Take 75 mg by mouth once daily.    ergocalciferol (VITAMIN D2) 50,000 unit Cap Take 50,000 Units by mouth every 7 days.    olmesartan-hydrochlorothiazide (BENICAR HCT) 40-25 mg per tablet Take 1 tablet by mouth once daily.    pantoprazole (PROTONIX) 40 MG tablet Take 40 mg by mouth once daily.       Review of patient's allergies indicates:  No Known Allergies      There is no immunization history on file for this patient.    No past medical history on file.  No past surgical history  on file.  Family History    None       Tobacco Use    Smoking status: Not on file    Smokeless tobacco: Not on file   Substance and Sexual Activity    Alcohol use: Not on file    Drug use: Not on file    Sexual activity: Not on file       Review of Systems:  12 point ROS negative except as stated in HPI.      Objective:     Vital Signs (Most Recent):  Temp: 97.6 °F (36.4 °C) (06/23/25 1602)  Pulse: 85 (06/23/25 1602)  Resp: 16 (06/23/25 1139)  BP: 125/81 (06/23/25 1602)  SpO2: 96 % (06/23/25 1602) Vital Signs (24h Range):  Temp:  [97.6 °F (36.4 °C)-98.2 °F (36.8 °C)] 97.6 °F (36.4 °C)  Pulse:  [] 85  Resp:  [16] 16  SpO2:  [96 %-99 %] 96 %  BP: ()/(59-81) 125/81     Weight: 95.3 kg (210 lb)  Body mass index is 31.01 kg/m².      Intake/Output Summary (Last 24 hours) at 6/23/2025 1941  Last data filed at 6/23/2025 1924  Gross per 24 hour   Intake --   Output 0 ml   Net 0 ml       Physical Exam:  General:  Well developed, well nourished, no acute distress  HEENT:  Normocephalic, atraumatic, PERRL, EOMI, clear sclera, ears normal, neck supple, throat clear without erythema or exudates  CVS:  RRR, S1 and S2 normal, no murmurs, rubs, gallops  Resp:  Lungs clear to auscultation, no wheezes, rales, rhonchi, cough  GI:  Abdomen soft, non-tender, non-distended, normoactive bowel sounds, no masses.   :  Deferred  MSK:  No muscle atrophy, cyanosis, peripheral edema, full range of motion  Skin:  No rashes, ulcers, erythema  Neuro:  CNII-XII grossly intact  Psych:  Alert and oriented to person, place, and time    Labs:  Recent Labs     06/23/25  1223   WBC 11.30   HGB 13.9*   HCT 43.8      INR 1.0     Recent Labs     06/23/25  1223      K 3.9      CO2 25   BUN 22.0   CREATININE 2.40*   GLU 93   CALCIUM 10.1*   MG 1.80   PHOS 4.2   PROT 7.9*   ALBUMIN 4.2   BILITOT 0.7   AST 19   ALKPHOS 60   ALT 20       Imaging:  X-Ray Chest 1 View  Result Date: 6/23/2025  1. No active cardiopulmonary disease  identified Electronically signed by: Marvin Ramos Date:    06/23/2025 Time:    15:20        Assessment/Plan:   Patient is a 59-year-old  male with a history of midepigastric abdominal pain nausea vomiting had hematemesis with white count of 11 hemoglobin of 13 hematocrit of 43 platelet count was 314 renal profile was unremarkable creatinine was 2.4 with a BUN of 22.  Patient required significant hydration and endoscopic evaluation of the upper gastrointestinal tract.  Troponin level was within normal limits and UA was with a +1 protein and occasional calcium oxalate crystals identified.  Chest x-ray was negative.  I was consulted for endoscopic evaluation of the upper gastrointestinal tract.     Plan   1. IV fluids   2. IV antibiotics  3. Possible CT abdomen and pelvis with oral contrast  4. Endoscopic evaluation later today             Thank you for your consult.     Tyron Valente MD  General Surgery  [facility]  Medical Surgical Unit

## 2025-06-24 NOTE — PLAN OF CARE
In morning huddle it was noted that the pt may benefit from home health and when presenting the choices for the home health services the pt declined stating that he has to get back to work .

## 2025-06-24 NOTE — OP NOTE
[facility] - Medical Surgical Unit  Surgery Department  Operative Note    SUMMARY     Date of Procedure: 6/23/2025     Procedure: Procedure(s) (LRB):  EGD, WITH CLOSED BIOPSY (N/A)   1. Normal duodenal in all 4 portions and into the jejunum   2. Normal antrum fundus and cardia of the stomach   3. Z-line at 36 cm with a 4 cm hiatal hernia  4. Linear ulcerations within the hiatal hernia most likely the source of the hematemesis   5. Cold biopsy of the antrum and GE junction taken for histo path and H pylori   6. Normal esophagus cricopharyngeus muscle vocal cords  Surgeons and Role:     * Tyron Valente MD - Primary    Assisting Surgeon: None    Pre-Operative Diagnosis: Orthostatic hypotension [I95.1]    Post-Operative Diagnosis: Post-Op Diagnosis Codes:     * Orthostatic hypotension [I95.1]  EGD, WITH CLOSED BIOPSY (N/A)   1. Normal duodenal in all 4 portions and into the jejunum   2. Normal antrum fundus and cardia of the stomach   3. Z-line at 36 cm with a 4 cm hiatal hernia  4. Linear ulcerations within the hiatal hernia most likely the source of the hematemesis   5. Cold biopsy of the antrum and GE junction taken for histo path and H pylori   6. Normal esophagus cricopharyngeus muscle vocal cords  Anesthesia: General    Description of the Procedure:  Patient is a 59-year-old  male with a history of hematemesis recently admitted for evaluation and workup.  I was consulted for endoscopic evaluation of the upper gastrointestinal tract.  Patient was brought to the GI suite underwent sedation and examination of the esophagus stomach and duodenal with a flexible Olympus scope.  Results were as follows.          * Orthostatic hypotension [I95.1]  EGD, WITH CLOSED BIOPSY (N/A)   1. Normal duodenal in all 4 portions and into the jejunum   2. Normal antrum fundus and cardia of the stomach   3. Z-line at 36 cm with a 4 cm hiatal hernia  4. Linear ulcerations within the hiatal hernia most likely the  source of the hematemesis   5. Cold biopsy of the antrum and GE junction taken for histo path and H pylori   6. Normal esophagus cricopharyngeus muscle vocal cords    Plan   1. Carafate 1 g p.o. Q 8 hours   2. Pepcid 20 mg p.o. q.8 hours  3. Pepto-Bismol 1 tbsp p.o. q.8 hours   4. Upper GI with small-bowel follow-through   5. Gastric emptying study   6. 24 hour pH studies  7. Manometric studies   8. Robotic hiatal hernia repair with fundoplication pending above         Complications:  No    Estimated Blood Loss (EBL): 0 mL           Implants: * No implants in log *    Specimens:   Specimen (24h ago, onward)       Start     Ordered    06/23/25 1934  Specimen to Pathology General Surgery  RELEASE UPON ORDERING        References:    Click here for ordering Quick Tip   Question:  Release to patient  Answer:  Immediate    06/23/25 1934                            Condition: Good    Disposition: PACU - hemodynamically stable.

## 2025-06-25 LAB
ABO + RH BLD: NORMAL
ABO + RH BLD: NORMAL
BLD PROD TYP BPU: NORMAL
BLD PROD TYP BPU: NORMAL
BLOOD UNIT EXPIRATION DATE: NORMAL
BLOOD UNIT EXPIRATION DATE: NORMAL
BLOOD UNIT TYPE CODE: 9500
BLOOD UNIT TYPE CODE: 9500
CROSSMATCH INTERPRETATION: NORMAL
CROSSMATCH INTERPRETATION: NORMAL
DISPENSE STATUS: NORMAL
DISPENSE STATUS: NORMAL
PSYCHE PATHOLOGY RESULT: NORMAL
UNIT NUMBER: NORMAL
UNIT NUMBER: NORMAL

## 2025-06-25 NOTE — DISCHARGE SUMMARY
[facility]                     [address] [address]    PATIENT NAME:       REHAN COHN  YOB: 1965  CSN:                [id number]   MRN:                [id number]  ADMIT DATE:         06/23/2025 10:59:00  PHYSICIAN:          Dashawn Sanchez MD                          DISCHARGE SUMMARY    DATE OF DISCHARGE:  06/24/2025 14:30:00    DISCHARGE DIAGNOSES:    1. Orthostatic hypotension with hemoptysis, most likely secondary to slow or   subacute GI bleed, status post EGD revealing linear ulcer in the hiatal hernia.    The patient is not requiring any PRBC transfusion, but treated with infusion of   crystalloids as well as Protonix drip and avoidance of any NSAIDs status post   EGD revealing the above.  Orthostatic hypotension now resolved.  2. History of hypertension.  3. History of dyslipidemia.  4. History of transient ischemic attack.  5. History of gastroesophageal reflux disease.  6. History of polyarthritis.  7. History of obesity.    HOSPITAL COURSE:  Mr. Rehan Cohn is a very pleasant 59-year-old gentleman, who   came to the clinic complaining of feeling jittery, palpitations, and weak.  He   was found to have orthostatic changes.  He also admitted that he had some   hemoptysis of dark colored clotted blood.  The patient was immediately admitted   to the hospital, started on crystalloids.  GI bleed scan was ordered, which was   negative.  Dr. Tyron Valente was consulted and he did an EGD, which revealed   a linear ulcer in the hiatal hernia, but there was no active bleeding, most   likely the culprit lesion.  The patient was given IV fluids in the form of   crystalloids, was kept n.p.o.  Protonix drip was used.  NSAIDs were prohibited.    DVT prophylaxis was given with bilateral SCDs.  After the EGD, the patient was   given the diet and it was advanced to the regular diet.  The patient will be    getting a colonoscopy and it will be done as an outpatient.  He will follow with   Dr. Tyron Valente in the clinic promptly and with me.  I have also asked   him to stop his Plavix and aspirin, and not to take any NSAIDs, as patient was   taking ibuprofen in the past and he agrees to that.  The patient is back to his   normal self.  His vital signs are back to normal.  He is no more having any   signs or symptoms of orthostatic hypotension.  He is not dizzy.  No palpitations   and no shortness of breath.    MEDICATIONS:  Upon discharge are as follows;  1. Bismuth subsalicylate 15 mL, to take 30 mL q.6 hours.  2. Famotidine 20 mg once daily.  3. Carafate 10 mL 4 times a day.  4. Pantoprazole 40 mg daily.  5. Xanax 0.5 mg 3 times a day as needed p.r.n.   6. Plavix 75 mg daily.  7. Olmesartan and HCTZ 40/25 one tablet daily.  8. Vitamin D 50,000 units by mouth daily.   9. Discontinue the Celebrex.    10. Discontinue the Mobic.    LABS AND INVESTIGATIONS:  EGD pertinent for linear ulcer in the hiatal hernia.     On discharge, hemoglobin 11.7 and hematocrit 37.2.  On admission, hemoglobin   10.9 and hematocrit 34.2.  Sodium 139, potassium 3.4, chloride 100, bicarb 24,   BUN 20, creatinine 1.67.  When patient was admitted, his BUN was 22, creatinine   was 2.4, GFR was 30, and his GFR was 46 after hydration.  Albumin 3.2.  Troponin   negative.  EKG, normal sinus rhythm.    It was pleasure taking care of Mr. Rehan Cohn during his stay at [facility].        ______________________________  Dashawn Sanchez MD    SS/AQS  DD:  06/24/2025  Time:  06:46PM  DT:  06/25/2025  Time:  05:10AM  Job #:  [id number]    cc:   MD Tyron Fleming MD        DISCHARGE SUMMARY

## 2025-06-30 DIAGNOSIS — R10.13 ABDOMINAL PAIN, EPIGASTRIC: Primary | ICD-10-CM

## 2025-06-30 DIAGNOSIS — K21.9 ESOPHAGEAL REFLUX: ICD-10-CM

## (undated) DEVICE — FORCEP ALLIGATOR 2.8MM W/NDL

## (undated) DEVICE — KIT SURGICAL COLON .25 1.1OZ

## (undated) DEVICE — BITE BLOCK ADULT LATEX FREE